# Patient Record
(demographics unavailable — no encounter records)

---

## 2025-07-14 NOTE — HISTORY OF PRESENT ILLNESS
[FreeTextEntry1] : Notes:  Graves' disease, s/p INTERIANO at least 20 years ago.    Transient throat discomfort led to a thyroid ultrasound. Last dose change .1 to .088 as of 1/2023  PMH: hyperlipidemia hypertension cerebral aneurysm - found during w/u of pulsatile tinnitus - incidental, felt to be unrelated to tinnitus